# Patient Record
Sex: MALE | Race: WHITE | HISPANIC OR LATINO | URBAN - METROPOLITAN AREA
[De-identification: names, ages, dates, MRNs, and addresses within clinical notes are randomized per-mention and may not be internally consistent; named-entity substitution may affect disease eponyms.]

---

## 2021-06-09 ENCOUNTER — OFFICE (OUTPATIENT)
Dept: URBAN - METROPOLITAN AREA CLINIC 102 | Facility: CLINIC | Age: 26
End: 2021-06-09

## 2021-06-09 VITALS
SYSTOLIC BLOOD PRESSURE: 117 MMHG | WEIGHT: 158 LBS | TEMPERATURE: 98.4 F | HEART RATE: 61 BPM | HEIGHT: 65 IN | DIASTOLIC BLOOD PRESSURE: 74 MMHG

## 2021-06-09 DIAGNOSIS — R10.84 GENERALIZED ABDOMINAL PAIN: ICD-10-CM

## 2021-06-09 DIAGNOSIS — R19.7 DIARRHEA, UNSPECIFIED: ICD-10-CM

## 2021-06-09 PROCEDURE — 99244 OFF/OP CNSLTJ NEW/EST MOD 40: CPT

## 2021-06-09 RX ORDER — DICYCLOMINE HYDROCHLORIDE 10 MG/1
CAPSULE ORAL
Qty: 90 | Refills: 2 | Status: ACTIVE
Start: 2021-06-09

## 2021-06-09 NOTE — SERVICEHPINOTES
RADHA PRINCE   is a   25   year old male who is being seen in consultation at the request of   VALERIE ROSS   for pain and diarrhea. Reports issues for 3-4 months which began without obvious trigger. BMs 4+ times per day. BSS type 7. No blood in the stool. Reports generalized abd pain, does not feel like cramping. Sx worse with pizza and other greasy or acidic foods, even with orange juice. Did lose weight initially but has gained this back. Eating 1 meal per day. +nocturnal BMs. He is currently taking antibiotics for a cut on his hand. Thinks father and sister had colostomy bag but unsure why. Unsure if family hx of IBD. Used to eat a lot of ketchup, 2 bottles per day. Drinks 24 pack of beer 3x/day. Was smoking 1.5 ppd now closer to 1 ppd. No GERD sx, denies n/v or melena.Recent labs (5/6/21)--normal CBC, CMP, negative celiac panel (elevated IgA). BR